# Patient Record
Sex: FEMALE | Race: WHITE | NOT HISPANIC OR LATINO | Employment: UNEMPLOYED | URBAN - METROPOLITAN AREA
[De-identification: names, ages, dates, MRNs, and addresses within clinical notes are randomized per-mention and may not be internally consistent; named-entity substitution may affect disease eponyms.]

---

## 2017-03-14 ENCOUNTER — HOSPITAL ENCOUNTER (EMERGENCY)
Facility: HOSPITAL | Age: 52
Discharge: HOME | End: 2017-03-14
Attending: EMERGENCY MEDICINE
Payer: MEDICAID

## 2017-03-14 VITALS
RESPIRATION RATE: 18 BRPM | TEMPERATURE: 97 F | DIASTOLIC BLOOD PRESSURE: 76 MMHG | HEART RATE: 84 BPM | SYSTOLIC BLOOD PRESSURE: 142 MMHG | OXYGEN SATURATION: 97 % | HEIGHT: 62 IN | BODY MASS INDEX: 28.89 KG/M2 | WEIGHT: 157 LBS

## 2017-03-14 DIAGNOSIS — H60.90 OTITIS EXTERNA, UNSPECIFIED CHRONICITY, UNSPECIFIED LATERALITY, UNSPECIFIED TYPE: Primary | ICD-10-CM

## 2017-03-14 LAB
ALBUMIN SERPL BCP-MCNC: 3.8 G/DL
ALP SERPL-CCNC: 86 U/L
ALT SERPL W/O P-5'-P-CCNC: 7 U/L
ANION GAP SERPL CALC-SCNC: 10 MMOL/L
AST SERPL-CCNC: 8 U/L
BASOPHILS NFR BLD: 2 %
BILIRUB SERPL-MCNC: 0.4 MG/DL
BUN SERPL-MCNC: 12 MG/DL
CALCIUM SERPL-MCNC: 9.4 MG/DL
CHLORIDE SERPL-SCNC: 106 MMOL/L
CO2 SERPL-SCNC: 24 MMOL/L
CREAT SERPL-MCNC: 0.7 MG/DL
DIFFERENTIAL METHOD: ABNORMAL
EOSINOPHIL NFR BLD: 4 %
ERYTHROCYTE [DISTWIDTH] IN BLOOD BY AUTOMATED COUNT: 14.3 %
EST. GFR  (AFRICAN AMERICAN): >60 ML/MIN/1.73 M^2
EST. GFR  (NON AFRICAN AMERICAN): >60 ML/MIN/1.73 M^2
GIANT PLATELETS BLD QL SMEAR: PRESENT
GLUCOSE SERPL-MCNC: 122 MG/DL
HCT VFR BLD AUTO: 43.7 %
HGB BLD-MCNC: 14.7 G/DL
LYMPHOCYTES # BLD AUTO: ABNORMAL K/UL
LYMPHOCYTES NFR BLD: 41 %
MCH RBC QN AUTO: 30.1 PG
MCHC RBC AUTO-ENTMCNC: 33.6 %
MCV RBC AUTO: 90 FL
MONOCYTES NFR BLD: 3 %
NEUTROPHILS NFR BLD: 50 %
PLATELET # BLD AUTO: 387 K/UL
PMV BLD AUTO: 9.6 FL
POTASSIUM SERPL-SCNC: 3.9 MMOL/L
PROT SERPL-MCNC: 8.7 G/DL
RBC # BLD AUTO: 4.88 M/UL
SODIUM SERPL-SCNC: 140 MMOL/L
WBC # BLD AUTO: 10.33 K/UL

## 2017-03-14 PROCEDURE — 94640 AIRWAY INHALATION TREATMENT: CPT

## 2017-03-14 PROCEDURE — 25000242 PHARM REV CODE 250 ALT 637 W/ HCPCS: Performed by: NURSE PRACTITIONER

## 2017-03-14 PROCEDURE — 96361 HYDRATE IV INFUSION ADD-ON: CPT

## 2017-03-14 PROCEDURE — 94760 N-INVAS EAR/PLS OXIMETRY 1: CPT

## 2017-03-14 PROCEDURE — 96360 HYDRATION IV INFUSION INIT: CPT

## 2017-03-14 PROCEDURE — 85007 BL SMEAR W/DIFF WBC COUNT: CPT

## 2017-03-14 PROCEDURE — 25000003 PHARM REV CODE 250: Performed by: NURSE PRACTITIONER

## 2017-03-14 PROCEDURE — 80053 COMPREHEN METABOLIC PANEL: CPT

## 2017-03-14 PROCEDURE — 99284 EMERGENCY DEPT VISIT MOD MDM: CPT | Mod: 25

## 2017-03-14 PROCEDURE — 85027 COMPLETE CBC AUTOMATED: CPT

## 2017-03-14 PROCEDURE — 25500020 PHARM REV CODE 255: Performed by: EMERGENCY MEDICINE

## 2017-03-14 RX ORDER — ACETAMINOPHEN 500 MG
1000 TABLET ORAL
Status: COMPLETED | OUTPATIENT
Start: 2017-03-14 | End: 2017-03-14

## 2017-03-14 RX ORDER — NEOMYCIN SULFATE, POLYMYXIN B SULFATE AND HYDROCORTISONE 10; 3.5; 1 MG/ML; MG/ML; [USP'U]/ML
4 SUSPENSION/ DROPS AURICULAR (OTIC)
Status: COMPLETED | OUTPATIENT
Start: 2017-03-14 | End: 2017-03-14

## 2017-03-14 RX ORDER — NEOMYCIN SULFATE, POLYMYXIN B SULFATE AND HYDROCORTISONE 10; 3.5; 1 MG/ML; MG/ML; [USP'U]/ML
4 SUSPENSION/ DROPS AURICULAR (OTIC) 3 TIMES DAILY
Qty: 10 ML | Refills: 0 | Status: SHIPPED | OUTPATIENT
Start: 2017-03-14 | End: 2017-03-24

## 2017-03-14 RX ORDER — IPRATROPIUM BROMIDE AND ALBUTEROL SULFATE 2.5; .5 MG/3ML; MG/3ML
3 SOLUTION RESPIRATORY (INHALATION)
Status: COMPLETED | OUTPATIENT
Start: 2017-03-14 | End: 2017-03-14

## 2017-03-14 RX ORDER — ALBUTEROL SULFATE 90 UG/1
1-2 AEROSOL, METERED RESPIRATORY (INHALATION) EVERY 6 HOURS PRN
Qty: 1 INHALER | Refills: 0 | Status: SHIPPED | OUTPATIENT
Start: 2017-03-14 | End: 2018-03-14

## 2017-03-14 RX ORDER — HYDROCODONE BITARTRATE AND ACETAMINOPHEN 5; 325 MG/1; MG/1
1 TABLET ORAL EVERY 4 HOURS PRN
Qty: 18 TABLET | Refills: 0 | Status: SHIPPED | OUTPATIENT
Start: 2017-03-14

## 2017-03-14 RX ADMIN — IPRATROPIUM BROMIDE AND ALBUTEROL SULFATE 3 ML: .5; 3 SOLUTION RESPIRATORY (INHALATION) at 10:03

## 2017-03-14 RX ADMIN — IOHEXOL 75 ML: 350 INJECTION, SOLUTION INTRAVENOUS at 12:03

## 2017-03-14 RX ADMIN — ACETAMINOPHEN 1000 MG: 500 TABLET ORAL at 11:03

## 2017-03-14 RX ADMIN — NEOMYCIN SULFATE, POLYMYXIN B SULFATE AND HYDROCORTISONE 4 DROP: 10; 3.5; 1 SUSPENSION/ DROPS AURICULAR (OTIC) at 11:03

## 2017-03-14 RX ADMIN — SODIUM CHLORIDE 1000 ML: 0.9 INJECTION, SOLUTION INTRAVENOUS at 10:03

## 2017-03-14 NOTE — ED TRIAGE NOTES
C/o  Throbbing lt. ear pain, sore throat, fever,  cough x 3 days. Denies recent injury. IBU taken 07:)0 today.

## 2017-03-14 NOTE — ED PROVIDER NOTES
Encounter Date: 3/14/2017    SCRIBE #1 NOTE: I, Erika Collier, am scribing for, and in the presence of,  Shante Condon NP. I have scribed the following portions of the note - Other sections scribed: HPI, ROS.       History     Chief Complaint   Patient presents with    Otalgia     left since yesterday     Review of patient's allergies indicates:  No Known Allergies  HPI Comments: CC: Otalgia    HPI: This patient is a 51 y.o. F who presents to ED c/o three day history of decreased left ear hearing and one day history of left ear pain. Patient states that pain is directly posterior to left ear and also has left sided facial pain immediately below her ear which radiates somewhat down neck. Patient has sensation as though there is something in her ear. Patient had fever of 101 F at 7:30 am today which was resolved with Motrin and her ear pain was also slightly alleviated with Motrin. Patient has two month history of Bronchitis and still has intermittent cough and wheezing. Patient states that she was given antibiotics, steroids, cough medicine, and inhaler for Bronchitis. Patient used inhaler this morning for symptoms. History of smoking tobacco use and smokes .5 packs of cigarettes per day. No known allergies.     The history is provided by the patient. No  was used.     Past Medical History:   Diagnosis Date    Bronchitis      Past Surgical History:   Procedure Laterality Date    APPENDECTOMY      TUBAL LIGATION       History reviewed. No pertinent family history.  Social History   Substance Use Topics    Smoking status: Current Every Day Smoker     Packs/day: 0.50     Types: Cigarettes    Smokeless tobacco: None    Alcohol use No     Review of Systems   Constitutional: Positive for fever (resolved).   HENT: Positive for ear pain (left) and hearing loss (left ear decreased hearing). Negative for sore throat.    Respiratory: Positive for cough and wheezing. Negative for shortness of  breath.    Cardiovascular: Negative for chest pain.   Gastrointestinal: Negative for nausea.   Genitourinary: Negative for dysuria.   Musculoskeletal: Negative for back pain.   Skin: Negative for rash.   Neurological: Negative for weakness.   Hematological: Does not bruise/bleed easily.       Physical Exam   Initial Vitals   BP Pulse Resp Temp SpO2   03/14/17 0934 03/14/17 0934 03/14/17 0934 03/14/17 0934 03/14/17 0934   174/84 88 17 98.1 °F (36.7 °C) 100 %     Physical Exam    Nursing note and vitals reviewed.  Constitutional: She appears well-developed and well-nourished.   HENT:   Head: Normocephalic.   Right Ear: External ear normal.   Right TM clear.   Left TM clear.   Left canal exquisitely tender.  Increase pain with pressure over tragus.  Mastoid bone very TTP.   No erythema, no swelling appreciated. No adenopathy appreciated.       Eyes: Conjunctivae are normal.   Neck: Normal range of motion. Neck supple.   Cardiovascular: Normal rate, regular rhythm and normal heart sounds.   Musculoskeletal: Normal range of motion.   Neurological: She is alert and oriented to person, place, and time.   Skin: Skin is warm and dry.         ED Course   Procedures  Labs Reviewed   CBC W/ AUTO DIFFERENTIAL - Abnormal; Notable for the following:        Result Value    Platelets 387 (*)     Mono% 3.0 (*)     Basophil% 2.0 (*)     All other components within normal limits   COMPREHENSIVE METABOLIC PANEL - Abnormal; Notable for the following:     Glucose 122 (*)     Total Protein 8.7 (*)     AST 8 (*)     ALT 7 (*)     All other components within normal limits             Medical Decision Making:   Initial Assessment:   51yr old female presents with left ear pain x 4 days.   Differential Diagnosis:   OE  OM  mastoiditis   ED Management:  Pts exam was positive for exquisite pain over the mastoid area.  No adenopathy was appreciated on exam.   Pt appears uncomfortable but not toxic.  BBS with insp/exp wheezing.      NMT x 3 of  proventil and atrovent.  occassional wheezes remain.  Pt states breathing much better.     Pt offered pain medication for cont c/o pain if she could get ride.  Tylenol 1gm given with small improvement.     Labs and CT were reviewed and discussed with pt.     Based on exam today I had concerns of mastoiditis secondary to the pts pain.  With neg ct, I believe pt has OE with probable associated tender lymph nodes.  Pt treated with cortisporin, norco, proventil and close f/u with PCP     Referrals given for primary care.     Pt verbalizes understanding of d/c instructions and will return for worsening condition.    Case discussed with attending who agrees with assessment and plan.               Scribe Attestation:   Scribe #1: I performed the above scribed service and the documentation accurately describes the services I performed. I attest to the accuracy of the note.    Attending Attestation:     Physician Attestation Statement for NP/PA:   I have conducted a face to face encounter with this patient in addition to the NP/PA, due to NP/PA Request    Other NP/PA Attestation Additions:      Medical Decision Makin-year-old female presenting with left ear pain since yesterday.  Significant tenderness to the mastoid area.  No induration.  No foreign bodies.  CT shows no evidence of mastoiditis.  I agree with plan.       Physician Attestation for Scribe:  Physician Attestation Statement for Scribe #1: I, Shante Condon NP, reviewed documentation, as scribed by Erika Collier in my presence, and it is both accurate and complete.                 ED Course     Clinical Impression:   The encounter diagnosis was Otitis externa, unspecified chronicity, unspecified laterality, unspecified type.    Disposition:   Disposition: Discharged  Condition: Stable       Shante Condon NP  17 1428       Tino Jurado MD  17 1436

## 2017-03-14 NOTE — DISCHARGE INSTRUCTIONS
Anatomy of the Ear    The ear is a complex and delicate organ. It collects sound waves so you can hear the world around you. The ear also has a second function--it helps you keep your balance. Your ear can be divided into 3 parts. The outer ear and middle ear help collect and amplify sound. The inner ear converts sound waves to messages that are sent to the brain. The inner ear also senses the movement and position of your head and body so you can maintain your balance and see clearly, even when you change positions.  The mastoid bone surrounds the middle ear. The external ear collects sound waves. The ear canal carries sound waves to the eardrum. The eardrum vibrates from sound waves, setting the middle ear bones in motion. The middle ear bones (ossicles) vibrate, transmitting sound waves to the inner ear. When the ear is healthy, air pressure remains balanced in the middle ear. The eustachian tube helps control air pressure in the middle ear. The semicircular canals help maintain balance. The vestibular nerve carries balance signals to the brain. The auditory nerve carries sound signals to the brain. The cochlea picks up sound waves and makes nerve signals.     Date Last Reviewed: 10/1/2016  © 5029-0048 [employer]. [address] [place] [address]. All rights reserved. This information is not intended as a substitute for professional medical care. Always follow your healthcare professional's instructions.          External Ear Infection (Adult)    External otitis (also called swimmers ear) is an infection in the ear canal. It is often caused by bacteria or fungus. It can occur a few days after water gets trapped in the ear canal (from swimming or bathing). It can also occur after cleaning too deeply in the ear canal with a cotton swab or other object. Sometimes, hair care products get into the ear canal and cause this problem.  Symptoms can include pain, fever, itching, redness,  drainage, or swelling of the ear canal. Temporary hearing loss may also occur.  Home care  · Do not try to clean the ear canal. This can push pus and bacteria deeper into the canal.  · Use prescribed ear drops as directed. These help reduce swelling and fight the infection. If an ear wick was placed in the ear canal, apply drops right onto the end of the wick. The wick will draw the medication into the ear canal even if it is swollen closed.  · A cotton ball may be loosely placed in the outer ear to absorb any drainage.  · You may use acetaminophen or ibuprofen to control pain, unless another medication was prescribed. Note: If you have chronic liver or kidney disease or ever had a stomach ulcer or GI bleeding, talk to your health care provider before taking any of these medications.  · Do not allow water to get into your ear when bathing. Also, avoid swimming until the infection has cleared.  Prevention  · Keep your ears dry. This helps lower the risk of infection. Dry your ears with a towel or hair dryer after getting wet. Also, use ear plugs when swimming.  · Do not stick any objects in the ear to remove wax.  · If you feel water trapped in your ear, use ear drops right away. You can get these drops over the counter at most drugstores. They work by removing water from the ear canal.  Follow-up care  Follow up with your health care provider in one week, or as advised.  When to seek medical advice  Call your health care provider right away if any of these occur:  · Ear pain becomes worse or doesnt improve after 3 days of treatment  · Redness or swelling of the outer ear occurs or gets worse  · Headache  · Painful or stiff neck  · Drowsiness or confusion  · Fever of 100.4ºF (38ºC) or higher, or as directed by your health care provider  · Seizure  Date Last Reviewed: 3/22/2015  © 9942-6535 [employer]. [address] [place] [address], [place] [address]. All rights reserved. This information is not intended as a  substitute for professional medical care. Always follow your healthcare professional's instructions.

## 2017-03-14 NOTE — ED AVS SNAPSHOT
[facility]  [place] [address]               Vanessa Murphy   3/14/2017 10:03 AM   ED    Description:  Female : 1965   Department:  [facility]           Your Care was Coordinated By:     Provider Role From To    Tino Jurado MD Attending Provider 17 1004 --    Shante Condon NP Nurse Practitioner 17 1004 --      Reason for Visit     Otalgia           Diagnoses this Visit        Comments    Otitis externa, unspecified chronicity, unspecified laterality, unspecified type    -  Primary       ED Disposition     None           To Do List           Follow-up Information     Schedule an appointment as soon as possible for a visit with Elvin Villanueva MD.    Specialty:  Family Medicine    Contact information:    [address] [place] [address]  [phone]          Follow up with [facility].    Specialty:  Emergency Medicine    Why:  If symptoms worsen or any other concerns    Contact information:    [address] [place] [id number] [phone]       These Medications        Disp Refills Start End    neomycin-polymyxin-hydrocortisone (CORTISPORIN) 3.5-10,000-1 mg/mL-unit/mL-% otic suspension 10 mL 0 3/14/2017 3/24/2017    Place 4 drops into the left ear 3 (three) times daily. - Left Ear    hydrocodone-acetaminophen 5-325mg (NORCO) 5-325 mg per tablet 18 tablet 0 3/14/2017     Take 1 tablet by mouth every 4 (four) hours as needed for Pain. - Oral      [facility] On Call     [facility] On Call Nurse Care Line -  Assistance  Registered nurses in the [facility] provide clinical advisement, health education, appointment booking, and other advisory services.  Call for this free service at [phone].             Medications           Message regarding Medications     Verify the changes and/or additions to your medication regime listed below are the same as discussed with  your clinician today.  If any of these changes or additions are incorrect, please notify your healthcare provider.        START taking these NEW medications        Refills    neomycin-polymyxin-hydrocortisone (CORTISPORIN) 3.5-10,000-1 mg/mL-unit/mL-% otic suspension 0    Sig: Place 4 drops into the left ear 3 (three) times daily.    Class: Print    Route: Left Ear    hydrocodone-acetaminophen 5-325mg (NORCO) 5-325 mg per tablet 0    Sig: Take 1 tablet by mouth every 4 (four) hours as needed for Pain.    Class: Print    Route: Oral      These medications were administered today        Dose Freq    albuterol-ipratropium 2.5mg-0.5mg/3mL nebulizer solution 3 mL 3 mL Every 5 min    Sig: Take 3 mLs by nebulization every 5 (five) minutes.    Class: Normal    Route: Nebulization    sodium chloride 0.9% bolus 1,000 mL 1,000 mL ED 1 Time    Sig: Inject 1,000 mLs into the vein ED 1 Time.    Class: Normal    Route: Intravenous    neomycin-polymyxin-hydrocortisone otic suspension 4 drop 4 drop ED 1 Time    Sig: Place 4 drops into the left ear ED 1 Time.    Class: Normal    Route: Left Ear    acetaminophen tablet 1,000 mg 1,000 mg ED 1 Time    Sig: Take 2 tablets (1,000 mg total) by mouth ED 1 Time.    Class: Normal    Route: Oral    omnipaque 350 iohexol 75 mL 75 mL IMG once as needed    Sig: Inject 75 mLs into the vein ONCE PRN for contrast.    Class: Normal    Route: Intravenous      STOP taking these medications     ibuprofen (ADVIL,MOTRIN) 200 MG tablet Take 200 mg by mouth every 6 (six) hours as needed for Pain.           Verify that the below list of medications is an accurate representation of the medications you are currently taking.  If none reported, the list may be blank. If incorrect, please contact your healthcare provider. Carry this list with you in case of emergency.           Current Medications     INHALER,ASSIST DEVICE,ACCESORY (INHALER,ASSIST DEVICES,ACCESS MISC) by Misc.(Non-Drug; Combo Route) route.     "hydrocodone-acetaminophen 5-325mg (NORCO) 5-325 mg per tablet Take 1 tablet by mouth every 4 (four) hours as needed for Pain.    neomycin-polymyxin-hydrocortisone (CORTISPORIN) 3.5-10,000-1 mg/mL-unit/mL-% otic suspension Place 4 drops into the left ear 3 (three) times daily.           Clinical Reference Information           Your Vitals Were     BP Pulse Temp Resp Height Weight    142/76 (BP Location: Right arm, Patient Position: Lying, BP Method: Automatic) 84 97.3 °F (36.3 °C) (Oral) 18 5' 2" (1.575 m) 71.2 kg (157 lb)    SpO2 BMI             97% 28.72 kg/m2         Allergies as of 3/14/2017     No Known Allergies      Immunizations Administered on Date of Encounter - 3/14/2017     None      ED Micro, Lab, POCT     Start Ordered       Status Ordering Provider    03/14/17 1102 03/14/17 1101    Once,   Status:  Canceled      Canceled     03/14/17 1027 03/14/17 1029  CBC auto differential  STAT      Final result     03/14/17 1027 03/14/17 1029  Comprehensive metabolic panel  STAT      Final result       ED Imaging Orders     Start Ordered       Status Ordering Provider    03/14/17 1041 03/14/17 1030  CT Temporal Bone with Contrast  1 time imaging     Comments:  pls evaluate with contrast to r/o abscess     Discussed with ct tech    Final result     03/14/17 1036 03/14/17 1030    1 time imaging,   Status:  Canceled     Comments:  pls evaluate with contrast to r/o abscess     Discussed with ct tech    Canceled     03/14/17 1031 03/14/17 1030    1 time imaging,   Status:  Canceled     Comments:  pls evaluate with contrast to r/o abscess     Discussed with ct tech    Canceled         Discharge Instructions         Anatomy of the Ear    The ear is a complex and delicate organ. It collects sound waves so you can hear the world around you. The ear also has a second function--it helps you keep your balance. Your ear can be divided into 3 parts. The outer ear and middle ear help collect and amplify sound. The inner ear " converts sound waves to messages that are sent to the brain. The inner ear also senses the movement and position of your head and body so you can maintain your balance and see clearly, even when you change positions.  The mastoid bone surrounds the middle ear. The external ear collects sound waves. The ear canal carries sound waves to the eardrum. The eardrum vibrates from sound waves, setting the middle ear bones in motion. The middle ear bones (ossicles) vibrate, transmitting sound waves to the inner ear. When the ear is healthy, air pressure remains balanced in the middle ear. The eustachian tube helps control air pressure in the middle ear. The semicircular canals help maintain balance. The vestibular nerve carries balance signals to the brain. The auditory nerve carries sound signals to the brain. The cochlea picks up sound waves and makes nerve signals.     Date Last Reviewed: 10/1/2016  © 9840-8944 [employer]. [address] [place] [address]. All rights reserved. This information is not intended as a substitute for professional medical care. Always follow your healthcare professional's instructions.          External Ear Infection (Adult)    External otitis (also called swimmers ear) is an infection in the ear canal. It is often caused by bacteria or fungus. It can occur a few days after water gets trapped in the ear canal (from swimming or bathing). It can also occur after cleaning too deeply in the ear canal with a cotton swab or other object. Sometimes, hair care products get into the ear canal and cause this problem.  Symptoms can include pain, fever, itching, redness, drainage, or swelling of the ear canal. Temporary hearing loss may also occur.  Home care  · Do not try to clean the ear canal. This can push pus and bacteria deeper into the canal.  · Use prescribed ear drops as directed. These help reduce swelling and fight the infection. If an ear wick was placed in the ear  canal, apply drops right onto the end of the wick. The wick will draw the medication into the ear canal even if it is swollen closed.  · A cotton ball may be loosely placed in the outer ear to absorb any drainage.  · You may use acetaminophen or ibuprofen to control pain, unless another medication was prescribed. Note: If you have chronic liver or kidney disease or ever had a stomach ulcer or GI bleeding, talk to your health care provider before taking any of these medications.  · Do not allow water to get into your ear when bathing. Also, avoid swimming until the infection has cleared.  Prevention  · Keep your ears dry. This helps lower the risk of infection. Dry your ears with a towel or hair dryer after getting wet. Also, use ear plugs when swimming.  · Do not stick any objects in the ear to remove wax.  · If you feel water trapped in your ear, use ear drops right away. You can get these drops over the counter at most drugstores. They work by removing water from the ear canal.  Follow-up care  Follow up with your health care provider in one week, or as advised.  When to seek medical advice  Call your health care provider right away if any of these occur:  · Ear pain becomes worse or doesnt improve after 3 days of treatment  · Redness or swelling of the outer ear occurs or gets worse  · Headache  · Painful or stiff neck  · Drowsiness or confusion  · Fever of 100.4ºF (38ºC) or higher, or as directed by your health care provider  · Seizure  Date Last Reviewed: 3/22/2015  © 4585-9025 [employer]. [address] [place] [address], [place] [address]. All rights reserved. This information is not intended as a substitute for professional medical care. Always follow your healthcare professional's instructions.          [facility] Sign-Up     Activating your [facility] account is as easy as 1-2-3!     1) Visit [email or web address], select Sign Up Now, enter this activation code and your date of birth, then select  Next.  [email or web address]  Expires: 4/28/2017  1:28 PM      2) Create a username and password to use when you visit [facility] in the future and select a security question in case you lose your password and select Next.    3) Enter your e-mail address and click Sign Up!    Additional Information  If you have questions, please e-mail [email or web address] or call [phone] to talk to our [employer] staff. Remember, [facility] is NOT to be used for urgent needs. For medical emergencies, dial 911.          [facility] complies with applicable Federal civil rights laws and does not discriminate on the basis of race, color, national origin, age, disability, or sex.        Language Assistance Services     ATTENTION: Language assistance services are available, free of charge. Please call [phone].      ATENCIÓN: Si habla español, tiene a dickinson disposición servicios gratuitos de asistencia lingüística. Llame al [phone].     ANNELIESE Ý: N?u b?n nói Ti?ng Vi?t, có các d?ch v? h? tr? ngôn ng? mi?n phí dành cho b?n. G?i s? [phone].

## 2021-09-22 ENCOUNTER — HOSPITAL ENCOUNTER (EMERGENCY)
Dept: HOSPITAL 97 - ER | Age: 56
Discharge: HOME | End: 2021-09-22
Payer: SELF-PAY

## 2021-09-22 VITALS — TEMPERATURE: 98 F | OXYGEN SATURATION: 100 % | SYSTOLIC BLOOD PRESSURE: 145 MMHG | DIASTOLIC BLOOD PRESSURE: 76 MMHG

## 2021-09-22 DIAGNOSIS — Z04.89: Primary | ICD-10-CM

## 2021-09-22 PROCEDURE — 99281 EMR DPT VST MAYX REQ PHY/QHP: CPT

## 2021-09-22 NOTE — EDPHYS
Physician Documentation                                                                           

 [facility]                                                                 

Name: Deepthi Mauricio                                                                                

Age: 55 yrs                                                                                       

Sex: Female                                                                                       

: 1965                                                                                   

MRN: [id number]                                                                                   

Arrival Date: 2021                                                                          

Time: 11:24                                                                                       

Account#: [id number]                                                                            

Bed 12                                                                                            

Private MD:                                                                                       

ED Physician Joanna Awan                                                                    

HPI:                                                                                              

                                                                                             

11:45 This 55 yrs old Female presents to ER via Ambulatory with complaints of labs to r/o     jr8 

      lead poisoning.                                                                             

11:45 This is a 55-year-old female patient that presented to the emergency room to rule out   jr8 

      lead poisoning. Patient stated that she was having ice cream with friends when she          

      noticed some sort of foreign objects in her ice cream that look like pencil lead.           

      Patient stated that she had contacted Jaquan Sampson and her  and told her to get        

      tested. Patient currently asymptomatic at this time..                                       

                                                                                                  

OB/GYN:                                                                                           

11:40 LMP N/A -                                                                               tw2 

                                                                                                  

Historical:                                                                                       

- Allergies:                                                                                      

11:30 No Known Allergies;                                                                     tw2 

- PMHx:                                                                                           

11:30 Drug abuse; "pain pills"; Chronic back pain;                                            tw2 

- PSHx:                                                                                           

11:30 Appendectomy; tuballigation;                                                            tw2 

                                                                                                  

- Immunization history:: Client reports receiving the 2nd dose of the Covid vaccine.              

- Social history:: Smoking status: Patient reports the use of cigarette tobacco                   

  products, smokes one-half pack cigarettes per day.                                              

                                                                                                  

                                                                                                  

ROS:                                                                                              

11:45 Eyes: Negative for injury, pain, redness, and discharge, ENT: Negative for injury,      jr8 

      pain, and discharge, Neck: Negative for injury, pain, and swelling, Cardiovascular:         

      Negative for chest pain, palpitations, and edema, Respiratory: Negative for shortness       

      of breath, cough, wheezing, and pleuritic chest pain, Abdomen/GI: Negative for              

      abdominal pain, nausea, vomiting, diarrhea, and constipation, Back: Negative for injury     

      and pain, MS/Extremity: Negative for injury and deformity, Skin: Negative for injury,       

      rash, and discoloration, Neuro: Negative for headache, weakness, numbness, tingling,        

      and seizure.                                                                                

                                                                                                  

Exam:                                                                                             

11:45 Constitutional:  This is a well developed, well nourished patient who is awake, alert,  jr8 

      and in no acute distress. Cardiovascular:  Regular rate and rhythm with a normal S1 and     

      S2.  No gallops, murmurs, or rubs.  Normal PMI, no JVD.  No pulse deficits.                 

      Respiratory:  Lungs have equal breath sounds bilaterally, clear to auscultation and         

      percussion.  No rales, rhonchi or wheezes noted.  No increased work of breathing, no        

      retractions or nasal flaring. Abdomen/GI:  Soft, non-tender, with normal bowel sounds.      

      No distension or tympany.  No guarding or rebound.  No evidence of tenderness               

      throughout. Skin:  Warm, dry with normal turgor.  Normal color with no rashes, no           

      lesions, and no evidence of cellulitis. MS/ Extremity:  Pulses equal, no cyanosis.          

      Neurovascular intact.  Full, normal range of motion. Neuro:  Awake and alert, GCS 15,       

      oriented to person, place, time, and situation.  Cranial nerves II-XII grossly intact.      

      Motor strength 5/5 in all extremities.  Sensory grossly intact.                             

                                                                                                  

Vital Signs:                                                                                      

11:28  / 76; Pulse 87; Resp 17; Temp 98(TE); Pulse Ox 100% on R/A; Weight 64.86 kg (R); tw2 

      Height 5 ft. 0 in. (152.40 cm); Pain 0/10;                                                  

11:28 Body Mass Index 27.93 (64.86 kg, 152.40 cm)                                             tw2 

                                                                                                  

MDM:                                                                                              

11:39 Patient medically screened.                                                             jr8 

11:45 Data reviewed: vital signs, nurses notes. Data interpreted: Pulse oximetry: on room air jr8 

      is 100 %. Interpretation: normal. Counseling: I had a detailed discussion with the          

      patient and/or guardian regarding: the historical points, exam findings, and any            

      diagnostic results supporting the discharge/admit diagnosis, the need for outpatient        

      follow up, a family practitioner. ED course: Discussed with patient that we do not test     

      for lead in the emergency room. That if she thought it was pencil lead, that it truly       

      is not lead and graphite and there fore she does not need to worry. That she can make       

      an appointment for this as an outpatient basis for further testing and evaluation as        

      she is asymptomatic and doesn't even know if she truly ingested it or not. If she had       

      retained the specimen that she could send it out for testing as well. Otherwise this is     

      a nonemergent process at this time..                                                        

                                                                                                  

Administered Medications:                                                                         

No medications were administered                                                                  

                                                                                                  

                                                                                                  

Disposition Summary:                                                                              

21 11:51                                                                                    

Discharge Ordered                                                                                 

      Location: Home                                                                          jr8 

      Problem: new                                                                            jr8 

      Symptoms: are unchanged                                                                 jr8 

      Condition: Stable                                                                       jr8 

      Diagnosis                                                                                   

        - Encounter for lab draw                                                              jr8 

      Followup:                                                                               jr8 

        - With: Private Physician                                                                  

        - When: 2 - 3 days                                                                         

        - Reason: Recheck today's complaints, Continuance of care, Re-evaluation by your           

      physician                                                                                   

      Forms:                                                                                      

        - Medication Reconciliation Form                                                      jr8 

        - Thank You Letter                                                                    jr8 

        - Antibiotic Education                                                                jr8 

        - Prescription Opioid Use                                                             jr8 

Addendum:                                                                                         

2021                                                                                        

     17:39 Co-signature as Attending Physician, Joanna Awan MD PA/NP's history reviewed,      m
a2

           patient interviewed, and examined. I agree with assessment and care plan and confirm   

           the diagnosis (es) above.                                                              

                                                                                                  

Signatures:                                                                                       

Maynor Vilchis PA PA   jr8                                                  

Rachel Archer RN                          RN   tw2                                                  

Joanna Awan MD MD   ma2                                                  

                                                                                                  

Corrections: (The following items were deleted from the chart)                                    

                                                                                             

11:52 11:45 ED course: Discussed with patient that we do not test for lead in the emergency   jr8 

      room. That she can make an appointment for this as an outpatient basis for further          

      testing and evaluation as she is asymptomatic. If she had retained the specimen that        

      she could send it out for testing as well. Otherwise this is a nonemergent process at       

      this time.. jr8                                                                             

12:07 11:45 ED course: Discussed with patient that we do not test for lead in the emergency   jr8 

      room. That she can make an appointment for this as an outpatient basis for further          

      testing and evaluation as she is asymptomatic and doesn't even know if she truly            

      ingested it or not. If she had retained the specimen that she could send it out for         

      testing as well. Otherwise this is a nonemergent process at this time.. jr8                 

                                                                                                  

**************************************************************************************************

## 2021-09-22 NOTE — ER
Nurse's Notes                                                                                     

 [facility]                                                                 

Name: Deepthi Mauricio                                                                                

Age: 55 yrs                                                                                       

Sex: Female                                                                                       

: 1965                                                                                   

MRN: [id number]                                                                                   

Arrival Date: 2021                                                                          

Time: 11:24                                                                                       

Account#: [id number]                                                                            

Bed 12                                                                                            

Private MD:                                                                                       

Diagnosis: Encounter for lab draw                                                                 

                                                                                                  

Presentation:                                                                                     

                                                                                             

11:28 Chief complaint: Patient states: i went to my sisters yesterday. We were eating ice     tw2 

      cream and there was a pencil lead in ice cream. We contacted Jaquan Sampson and a . So     

      that's what brought me here. Coronavirus screen: At this time, the client does not          

      indicate any symptoms associated with coronavirus-19. Ebola Screen: Patient denies          

      travel to an Ebola-affected area in the 21 days before illness onset. Initial Sepsis        

      Screen: Does the patient meet any 2 criteria? No. Patient's initial sepsis screen is        

      negative. Does the patient have a suspected source of infection? No. Patient's initial      

      sepsis screen is negative. Risk Assessment: Do you want to hurt yourself or someone         

      else? Patient reports no desire to harm self or others. Onset of symptoms was 2021.                                                                                   

11:28 Method Of Arrival: Ambulatory                                                           tw2 

11:28 Acuity: ABEL 3                                                                           tw2 

                                                                                                  

Triage Assessment:                                                                                

11:30 General: Appears in no apparent distress. Behavior is cooperative, appropriate for age. tw2 

      Pain: Complains of pain in back.                                                            

                                                                                                  

OB/GYN:                                                                                           

11:40 LMP N/A -                                                                               tw2 

                                                                                                  

Historical:                                                                                       

- Allergies:                                                                                      

11:30 No Known Allergies;                                                                     tw2 

- PMHx:                                                                                           

11:30 Drug abuse; "pain pills"; Chronic back pain;                                            tw2 

- PSHx:                                                                                           

11:30 Appendectomy; tuballigation;                                                            tw2 

                                                                                                  

- Immunization history:: Client reports receiving the 2nd dose of the Covid vaccine.              

- Social history:: Smoking status: Patient reports the use of cigarette tobacco                   

  products, smokes one-half pack cigarettes per day.                                              

                                                                                                  

                                                                                                  

Screenin:40 Abuse screen: Denies threats or abuse. Nutritional screening: No deficits noted.        tw2 

      Tuberculosis screening: No symptoms or risk factors identified. Fall Risk None              

      identified.                                                                                 

                                                                                                  

Assessment:                                                                                       

11:40 General: Appears in no apparent distress. Pain: Complains of pain in abdomen Pain began tc5 

      1 day ago. reports she at ice cream yesterday and found pencil led in it, states she        

      has been cramping today 8/10. Neuro: No deficits noted. Cardiovascular: No deficits         

      noted. Respiratory: No deficits noted. GI: Pt states there was pencil led in her ice        

      cream yesterday, cramping today. : No deficits noted. EENT: No deficits noted. Derm:      

      No deficits noted. Musculoskeletal: No deficits noted.                                      

                                                                                                  

Vital Signs:                                                                                      

11:28  / 76; Pulse 87; Resp 17; Temp 98(TE); Pulse Ox 100% on R/A; Weight 64.86 kg (R); tw2 

      Height 5 ft. 0 in. (152.40 cm); Pain 0/10;                                                  

11:28 Body Mass Index 27.93 (64.86 kg, 152.40 cm)                                             tw2 

                                                                                                  

ED Course:                                                                                        

11:24 Patient arrived in ED.                                                                  as  

11:30 Triage completed.                                                                       tw2 

11:32 Arm band placed on.                                                                     tw2 

11:33 Bed in low position. Call light in reach.                                               tw2 

11:34 Jewell Joya, RN is Primary Nurse.                                                tc5 

11:39 Maynor Vilchis PA is PHCP.                                                               jr8 

11:39 Joanna Awan MD is Attending Physician.                                           jr8 

                                                                                                  

Administered Medications:                                                                         

No medications were administered                                                                  

                                                                                                  

                                                                                                  

Outcome:                                                                                          

11:51 Discharge ordered by MD.                                                                jr8 

11:53 Patient left the ED.                                                                    tc5 

                                                                                                  

Signatures:                                                                                       

Mariaelena Meza Josh, PA                        PA   jr8                                                  

Rachel Archer RN RN   tw2                                                  

Jewell Joya RN                  RN   tc5                                                  

                                                                                                  

**************************************************************************************************

## 2021-09-25 ENCOUNTER — HOSPITAL ENCOUNTER (EMERGENCY)
Dept: HOSPITAL 88 - ER | Age: 56
Discharge: HOME | End: 2021-09-25
Payer: SELF-PAY

## 2021-09-25 VITALS — HEIGHT: 60 IN | BODY MASS INDEX: 33.38 KG/M2 | WEIGHT: 170 LBS

## 2021-09-25 DIAGNOSIS — T18.9XXA: Primary | ICD-10-CM

## 2021-09-25 PROCEDURE — 99283 EMERGENCY DEPT VISIT LOW MDM: CPT
